# Patient Record
Sex: FEMALE
[De-identification: names, ages, dates, MRNs, and addresses within clinical notes are randomized per-mention and may not be internally consistent; named-entity substitution may affect disease eponyms.]

---

## 2024-04-29 ENCOUNTER — APPOINTMENT (OUTPATIENT)
Dept: OTOLARYNGOLOGY | Facility: CLINIC | Age: 59
End: 2024-04-29
Payer: COMMERCIAL

## 2024-04-29 VITALS — WEIGHT: 152 LBS | BODY MASS INDEX: 24.43 KG/M2 | HEIGHT: 66 IN

## 2024-04-29 DIAGNOSIS — H93.299 OTHER ABNORMAL AUDITORY PERCEPTIONS, UNSPECIFIED EAR: ICD-10-CM

## 2024-04-29 DIAGNOSIS — H61.21 IMPACTED CERUMEN, RIGHT EAR: ICD-10-CM

## 2024-04-29 DIAGNOSIS — Z80.9 FAMILY HISTORY OF MALIGNANT NEOPLASM, UNSPECIFIED: ICD-10-CM

## 2024-04-29 DIAGNOSIS — J30.0 VASOMOTOR RHINITIS: ICD-10-CM

## 2024-04-29 DIAGNOSIS — Z86.39 PERSONAL HISTORY OF OTHER ENDOCRINE, NUTRITIONAL AND METABOLIC DISEASE: ICD-10-CM

## 2024-04-29 DIAGNOSIS — H90.41 SENSORINEURAL HEARING LOSS, UNILATERAL, RIGHT EAR, WITH UNRESTRICTED HEARING ON THE CONTRALATERAL SIDE: ICD-10-CM

## 2024-04-29 PROBLEM — Z00.00 ENCOUNTER FOR PREVENTIVE HEALTH EXAMINATION: Status: ACTIVE | Noted: 2024-04-29

## 2024-04-29 PROCEDURE — 99203 OFFICE O/P NEW LOW 30 MIN: CPT | Mod: 25

## 2024-04-29 PROCEDURE — G0268 REMOVAL OF IMPACTED WAX MD: CPT

## 2024-04-29 PROCEDURE — 92557 COMPREHENSIVE HEARING TEST: CPT

## 2024-04-29 PROCEDURE — 92567 TYMPANOMETRY: CPT

## 2024-04-29 PROCEDURE — 31231 NASAL ENDOSCOPY DX: CPT

## 2024-04-29 RX ORDER — ATORVASTATIN CALCIUM 80 MG/1
TABLET, FILM COATED ORAL
Refills: 0 | Status: ACTIVE | COMMUNITY

## 2024-04-29 RX ORDER — METFORMIN HYDROCHLORIDE 625 MG/1
TABLET ORAL
Refills: 0 | Status: ACTIVE | COMMUNITY

## 2024-04-29 RX ORDER — FLUTICASONE PROPIONATE 50 UG/1
50 SPRAY, METERED NASAL
Qty: 1 | Refills: 3 | Status: ACTIVE | COMMUNITY
Start: 2024-04-29 | End: 1900-01-01

## 2024-04-29 NOTE — PHYSICAL EXAM
[FreeTextEntry1] : Microscopic ear exam with cerumen debridement:  Right ear: Obstructing cerumen was debrided from the ear canal using suction, and curet.  The ear canal was within normal limits.  The tympanic membrane was intact and noninflamed.  Areas of atrophy.  No retraction and no inflammation.  Left ear: The ear canal was patent and nonobstructed.  The tympanic membrane was intact and noninflamed.  Mild retraction with contact to the ossicular chain.  No effusion noted [Nasal Endoscopy Performed] : nasal endoscopy was performed, see procedure section for findings [Normal] : mucosa is normal [Midline] : trachea located in midline position

## 2024-04-29 NOTE — ASSESSMENT
[FreeTextEntry1] : History of right tympanic membrane repair many years ago.  The eardrum appears to be intact without difficulty.  Mild retraction is present in the left ear.  Ear hygiene reviewed.  Clinical monitoring advised.  Management of chronic rhinitis discussed.  Consistent use of steroidal nasal spray recommended.  Allergy evaluation recommended.

## 2024-04-29 NOTE — DATA REVIEWED
[de-identified] : In light of the patients current symptoms, Complete audiometry was ordered and completed today. I have interpreted these results and reviewed them in detail with the patient.  Borderline hearing loss right ear with deep tympanometry bilaterally.

## 2024-04-29 NOTE — HISTORY OF PRESENT ILLNESS
[de-identified] : JOHN ODONNELL has a history of ear pain.  Underwent right ear surgery x 2 in 2004 for tympanic perforation. Vertigo resulted in ED visit in October 2023 which resolved. No associated ear symptoms such as hearing loss or tinnitus or fullness.  Also reports chronic sinus problems and reported including nasal airway obstruction and paranasal headaches

## 2024-04-29 NOTE — PROCEDURE
[FreeTextEntry6] : PROCEDURE:  NASAL ENDOSCOPY    Surgeon: Dr. Adames Indication: Chronic Rhinitis Anesthetic: Topical lidocaine and Afrin Procedure: The patient was placed in a sitting position.  Following application of the topical anesthetic and decongestant, exam was performed with a flexible endoscope.  The following anatomic sites were directly examined bilaterally in a sequential fashion: The scope was introduced in the nasal passage between the middle and inferior turbinates to exam the inferior portion of the middle meatus and the fontanelle, as well as the maxillary ostia. Next, the scope was passed medially and posteriorly to the middle turbinates to examine the sphenoethmoid recess and the superior turbinate region.  Nasopharynx: no masses, choanae patent, no adenoid tissue  The following findings were noted: Mild to Moderate turbinate hypertrophy causing partial airway obstruction.  No purulence or polypoid disease noted.